# Patient Record
Sex: MALE | Race: WHITE | NOT HISPANIC OR LATINO | Employment: OTHER | URBAN - METROPOLITAN AREA
[De-identification: names, ages, dates, MRNs, and addresses within clinical notes are randomized per-mention and may not be internally consistent; named-entity substitution may affect disease eponyms.]

---

## 2022-07-11 ENCOUNTER — TRANSFERRED RECORDS (OUTPATIENT)
Dept: HEALTH INFORMATION MANAGEMENT | Facility: CLINIC | Age: 72
End: 2022-07-11

## 2022-07-15 ENCOUNTER — TRANSFERRED RECORDS (OUTPATIENT)
Dept: HEALTH INFORMATION MANAGEMENT | Facility: CLINIC | Age: 72
End: 2022-07-15

## 2022-07-25 ENCOUNTER — TRANSFERRED RECORDS (OUTPATIENT)
Dept: HEALTH INFORMATION MANAGEMENT | Facility: CLINIC | Age: 72
End: 2022-07-25

## 2022-08-04 ENCOUNTER — TRANSFERRED RECORDS (OUTPATIENT)
Dept: HEALTH INFORMATION MANAGEMENT | Facility: CLINIC | Age: 72
End: 2022-08-04

## 2022-08-16 ENCOUNTER — TELEPHONE (OUTPATIENT)
Dept: UROLOGY | Facility: CLINIC | Age: 72
End: 2022-08-16

## 2022-08-16 NOTE — TELEPHONE ENCOUNTER
Spoke to patient's wife and explained that a high priority message has been sent to the [facility] scheduling team. Patient and patient's wife expressed understanding. A hold will be placed on 8/30 at 10:30 AM just in case the appointment at [facility] does not work out. Writer explained that she will keep an eye on the patient's appointment situation. Patient was given the [facility] clinic number in case they do not hear back from [facility]. Patient's wife also let the writer know that records should be coming in from [facility] at [place], [place]. Patient has been seen by Dr. Ch. Will route message to care coordinator.

## 2022-08-16 NOTE — TELEPHONE ENCOUNTER
----- Message from SHENG Cardona sent at 2022  1:11 PM CDT -----  Hello,    I spoke to this patient today per Dr. Edmondson's request. The patient wants to be seen at [place] on  around 3 PM as they are driving from [place], [place] and have a pretty tight schedule. Please call him or his wife as soon as possible. They are expecting a call today. I also let Dr. Edmondson know about this situation.     Thank you,  SHENG Cardona  ----- Message -----  From: Yonathan Edmondson MD  Sent: 8/15/2022   9:36 PM CDT  To: SHENG Cardona    There is a patient with bladder cancer being referred to me by one of the transplant surgeons. Can you find an appt for him on ? Might need to be an overbook....    Thanks!  Santos Anguiano   1950  [phone]

## 2022-08-16 NOTE — TELEPHONE ENCOUNTER
Spoke to patient's wife about consultation appointment for the patient on 8/30. Patient was offered 10:30 AM or 11 AM, which does not work for them since they are driving from [place]. Writer explained that the provider works in [place] on Tuesdays and [place] on Thursdays. Patient wants to see if they can see Dr. Edmondson at [place] around 3 PM. Will route message to [place] schedulers.

## 2022-08-19 ENCOUNTER — TRANSFERRED RECORDS (OUTPATIENT)
Dept: HEALTH INFORMATION MANAGEMENT | Facility: CLINIC | Age: 72
End: 2022-08-19

## 2022-09-02 ENCOUNTER — OFFICE VISIT (OUTPATIENT)
Dept: UROLOGY | Facility: CLINIC | Age: 72
End: 2022-09-02
Payer: COMMERCIAL

## 2022-09-02 VITALS
HEIGHT: 69 IN | BODY MASS INDEX: 29.33 KG/M2 | SYSTOLIC BLOOD PRESSURE: 130 MMHG | DIASTOLIC BLOOD PRESSURE: 70 MMHG | WEIGHT: 198 LBS

## 2022-09-02 DIAGNOSIS — C67.9 MALIGNANT NEOPLASM OF URINARY BLADDER, UNSPECIFIED SITE (H): Primary | ICD-10-CM

## 2022-09-02 PROCEDURE — 99204 OFFICE O/P NEW MOD 45 MIN: CPT | Performed by: UROLOGY

## 2022-09-02 RX ORDER — BIOTIN 10 MG
1 TABLET ORAL DAILY
COMMUNITY

## 2022-09-02 RX ORDER — UBIDECARENONE 100 MG
100 CAPSULE ORAL
COMMUNITY

## 2022-09-02 RX ORDER — AMLODIPINE BESYLATE AND ATORVASTATIN CALCIUM 5; 10 MG/1; MG/1
1 TABLET, FILM COATED ORAL DAILY
COMMUNITY

## 2022-09-02 RX ORDER — CHOLECALCIFEROL (VITAMIN D3) 50 MCG
1 TABLET ORAL DAILY
COMMUNITY

## 2022-09-02 RX ORDER — ZINC GLUCONATE 50 MG
50 TABLET ORAL DAILY
COMMUNITY

## 2022-09-02 RX ORDER — LOSARTAN POTASSIUM 50 MG/1
50 TABLET ORAL
COMMUNITY

## 2022-09-02 RX ORDER — METOPROLOL SUCCINATE 50 MG/1
1 TABLET, EXTENDED RELEASE ORAL DAILY
COMMUNITY
Start: 2021-11-22

## 2022-09-02 ASSESSMENT — PAIN SCALES - GENERAL: PAINLEVEL: NO PAIN (0)

## 2022-09-02 NOTE — PROGRESS NOTES
Chief Complaint:   Bladder Cancer         History of Present Illness:    Shoaib Anguiano is a very pleasant 72 year old male who presents with a history of bladder cancer. He initially presented in July with gross hematuria. TURBT completed 8/4/2022 was found to represent HG Ta urothelial carcinoma. Reportedly was a large tumor with complete resection, per review of outside records. He has since been advised to undergo induction BCG and is here today to discuss this new diagnosis, and seek a second opinion regarding treatment options.    His TURBT was complicated by clot retention requiring take-back to OR, but urine has now cleared and symptoms resolving.         Past Medical History:   HTN  HLD  LVH         Past Surgical History:   Bunionectomy  Right knee replacement  TURBT         Medications     Current Outpatient Medications   Medication     amLODIPine-atorvastatin (CADUET) 5-10 MG tablet     ASPIRIN 81 PO     Biotin 10 MG TABS tablet     BLACK ELDERBERRY PO     co-enzyme Q-10 100 MG CAPS capsule     Glucosamine-Chondroitin 500-250 MG CAPS     losartan (COZAAR) 50 MG tablet     metoprolol succinate ER (TOPROL XL) 50 MG 24 hr tablet     MULTIPLE VITAMINS-MINERALS ER PO     TURMERIC CURCUMIN PO     vitamin B-12 (CYANOCOBALAMIN) 1000 MCG tablet     vitamin D3 (CHOLECALCIFEROL) 50 mcg (2000 units) tablet     zinc gluconate 50 MG tablet     No current facility-administered medications for this visit.          Family History:   No known family history of  malignancy.         Social History:     Social History     Socioeconomic History     Marital status:      Spouse name: Not on file     Number of children: Not on file     Years of education: Not on file     Highest education level: Not on file   Occupational History     Not on file   Tobacco Use     Smoking status: Not on file     Smokeless tobacco: Not on file   Substance and Sexual Activity     Alcohol use: Not on file     Drug use: Not on  "file     Sexual activity: Not on file   Other Topics Concern     Not on file   Social History Narrative     Not on file     Social Determinants of Health     Financial Resource Strain: Not on file   Food Insecurity: Not on file   Transportation Needs: Not on file   Physical Activity: Not on file   Stress: Not on file   Social Connections: Not on file   Intimate Partner Violence: Not on file   Housing Stability: Not on file          Allergies:   Patient has no allergy information on record.         Review of Systems:  From intake questionnaire     Skin: negative  Eyes: negative  Ears/Nose/Throat: negative  Respiratory: No shortness of breath, dyspnea on exertion, cough, or hemoptysis  Cardiovascular: No chest pain or palpitations  Gastrointestinal: negative; no nausea/vomiting, constipation or diarrhea  Genitourinary: as per HPI  Musculoskeletal: negative  Neurologic: negative  Psychiatric: negative  Hematologic/Lymphatic/Immunologic: negative  Endocrine: negative         Physical Exam:     Patient is a 72 year old  male   Vitals: Blood pressure 130/70, height 1.753 m (5' 9\"), weight 89.8 kg (198 lb).  Constitutional: Body mass index is 29.24 kg/m .  Alert, no acute distress, oriented, conversant  Eyes: no scleral icterus; extraocular muscles intact, moist conjunctivae  Respiratory: no respiratory distress, or pursed lip breathing  Cardiovascular: pulses strong and intact; no obvious jugular venous distension present  Gastrointestinal: soft, nontender, no organomegaly or masses,   Musculoskeletal: extremities normal, no peripheral edema  Skin: no suspicious lesions or rashes  Neuro: Alert, oriented, speech and mentation normal  Psych: affect and mood normal, alert and oriented to person, place and time      Labs and Pathology:    I reviewed all applicable laboratory and pathology data and went over findings with patient    Pathology 8/4/2022  HG Ta with CIS      Imaging:    The following imaging exams were " independently viewed and interpreted by me and discussed with patient:    CT from July 2022 reviewed demonstrating posterior bladder mass      Outside and Past Medical records:    Review of prior external note(s) from - Outside records from [facility][note 3] - op notes and progress reports; pathology  Review of the result(s) of each unique test - pathology         Assessment and Plan:     Assessment: 72 year old male with HG Ta urothelial carcinoma, s/p TURBT 8/4/2022. We discussed this finding in detail today. We discussed the natural history of non muscle invasive bladder cancer. Given the high-grade pathology, we reviewed the rationale of intravesical BCG. The risks/benefits, and side effects were discussed. We reviewed the typical treatment course of 6 weekly cycles. We also discussed the risk of tumor recurrence, and the importance of close surveillance. Furthermore, we reviewed the rationale for maintenance BCG. At this point, he plans to receive BCG at his local clinic. He will consider if he wants to do further follow-up with me pending his response.    Plan:  Induction BCG to be done locally and office cystoscopy in 3 months. He will contact me if he desires further follow-up here.    45 total minutes spent on the date of the encounter including direct interaction with the patient, performing chart review, documentation and further activities as noted above.    Yonathan Edmondson MD  Urology  [facility]

## 2022-09-07 PROBLEM — I42.1 OBSTRUCTIVE HYPERTROPHIC CARDIOMYOPATHY (H): Status: ACTIVE | Noted: 2020-07-24

## 2022-09-07 PROBLEM — I10 ESSENTIAL (PRIMARY) HYPERTENSION: Status: ACTIVE | Noted: 2020-01-28

## 2022-09-07 PROBLEM — M17.11 PRIMARY LOCALIZED OSTEOARTHRITIS OF RIGHT KNEE: Status: ACTIVE | Noted: 2019-10-09

## 2022-09-07 PROBLEM — C67.9 MALIGNANT NEOPLASM OF URINARY BLADDER, UNSPECIFIED SITE (H): Status: ACTIVE | Noted: 2022-09-07
